# Patient Record
Sex: FEMALE | Race: WHITE | NOT HISPANIC OR LATINO | URBAN - NONMETROPOLITAN AREA
[De-identification: names, ages, dates, MRNs, and addresses within clinical notes are randomized per-mention and may not be internally consistent; named-entity substitution may affect disease eponyms.]

---

## 2023-05-26 ENCOUNTER — OFFICE VISIT (OUTPATIENT)
Dept: OBGYN CLINIC | Facility: CLINIC | Age: 73
End: 2023-05-26

## 2023-05-26 VITALS
TEMPERATURE: 98.2 F | HEIGHT: 67 IN | DIASTOLIC BLOOD PRESSURE: 80 MMHG | WEIGHT: 175 LBS | SYSTOLIC BLOOD PRESSURE: 142 MMHG | HEART RATE: 81 BPM | BODY MASS INDEX: 27.47 KG/M2
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
## 2023-05-26 DIAGNOSIS — S52.532A CLOSED COLLES' FRACTURE OF LEFT RADIUS, INITIAL ENCOUNTER: Primary | ICD-10-CM

## 2023-05-26 RX ORDER — HYDROCODONE BITARTRATE AND ACETAMINOPHEN 5; 325 MG/1; MG/1
2 TABLET ORAL
COMMUNITY
Start: 2023-05-07

## 2023-05-26 NOTE — PATIENT INSTRUCTIONS
Cast Care   WHAT YOU NEED TO KNOW:   Cast care will help the cast dry and harden correctly, and then protect it until it comes off  Your cast may need up to 48 hours to dry and harden completely  Even after your cast hardens, it can be damaged  DISCHARGE INSTRUCTIONS:   Return to the emergency department if:   Your cast breaks or gets damaged  You see drainage, or your cast is stained or smells bad  Your skin turns blue or pale  Your skin tingles, burns, or is cold or numb  You have severe pain that is getting worse and does not go away after you take pain medicine  Your limb swells, or your cast looks or feels tighter than it was before  Contact your healthcare provider if:   Something falls into your cast and gets stuck  You have itching, pain, burning, or weakness where you have the cast      You have a fever  You have sores, blisters, or breaks on the skin around the edges of the cast     You have questions or concerns about your condition or care  Follow up with your healthcare provider as directed: You will need to return to have your cast removed and your bones checked  Write down your questions so you remember to ask them during your visits  Care for your cast while it hardens:   Protect the cast   Do not put weight on the cast  Do not bend, lean on, or hit the cast with anything  Use the palms of your hands when you move the cast  Do not use your fingers  Your fingers may leave marks on the cast as it dries  Change positions often  Change your position every 2 hours to help the cast dry faster  Prop your cast on something soft, such as a pillow, to prevent a flat area on your cast      Keep the cast dry  Tie plastic trash bags around your cast to keep it dry while you bathe  You may use a blow dryer on cool or the lowest heat setting to dry your cast if it gets wet  Do not use a high heat setting, because you may burn your skin  Certain casts can get wet   Ask if you have a waterproof cast     Care for your cast after it hardens:   Check your cast every day  Contact your healthcare provider if you notice any cracks, dents, holes, or flaking on your cast      Keep your cast clean and dry  Cover your cast with a towel when you eat  You may have a small piece of cast that can be removed to check on incisions under your cast  Make sure the small piece of cast is kept tightly closed  If your cast gets dirty, use a mild detergent and a damp washcloth to wipe off the outside of your cast  Continue to cover your cast with trash bags to keep it dry while you bathe  Care for the edges of your cast   Cover the cast edges to keep them smooth  Use 4 inch pieces of waterproof tape  Place one end of the tape under the inside edge of your cast and fold it over to the outside surface  Overlap tape strips until the edges are completely covered  Change the tape as directed  Do not pull or repair any of the padding from inside the cast  This could cause blisters and sores on the skin under your cast      Keep weight off your cast   Do not let anyone push down or lean on your cast  This may cause it to break  Do not use sharp objects  Do not use a sharp or pointed object to scratch under your cast  This may cause wounds that can get infected, or you may lose the item inside the cast  If your skin itches, blow cool air under the cast  You may also gently scratch your skin outside the cast with a cloth  © Copyright Latosha Connelly 2022 Information is for End User's use only and may not be sold, redistributed or otherwise used for commercial purposes  The above information is an  only  It is not intended as medical advice for individual conditions or treatments  Talk to your doctor, nurse or pharmacist before following any medical regimen to see if it is safe and effective for you
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
## 2023-06-23 ENCOUNTER — OFFICE VISIT (OUTPATIENT)
Dept: OBGYN CLINIC | Facility: CLINIC | Age: 73
End: 2023-06-23

## 2023-06-23 ENCOUNTER — HOSPITAL ENCOUNTER (OUTPATIENT)
Dept: RADIOLOGY | Facility: CLINIC | Age: 73
End: 2023-06-23
Payer: MEDICARE

## 2023-06-23 VITALS
SYSTOLIC BLOOD PRESSURE: 138 MMHG | BODY MASS INDEX: 27.65 KG/M2 | WEIGHT: 176.2 LBS | HEIGHT: 67 IN | OXYGEN SATURATION: 99 % | TEMPERATURE: 95.8 F | HEART RATE: 69 BPM | DIASTOLIC BLOOD PRESSURE: 60 MMHG

## 2023-06-23 DIAGNOSIS — S52.532D CLOSED COLLES' FRACTURE OF LEFT RADIUS WITH ROUTINE HEALING, SUBSEQUENT ENCOUNTER: ICD-10-CM

## 2023-06-23 DIAGNOSIS — S52.532D CLOSED COLLES' FRACTURE OF LEFT RADIUS WITH ROUTINE HEALING, SUBSEQUENT ENCOUNTER: Primary | ICD-10-CM

## 2023-06-23 PROCEDURE — 73100 X-RAY EXAM OF WRIST: CPT

## 2023-06-23 PROCEDURE — 99024 POSTOP FOLLOW-UP VISIT: CPT | Performed by: ORTHOPAEDIC SURGERY

## 2023-06-23 RX ORDER — DOCUSATE SODIUM 100 MG/1
CAPSULE, LIQUID FILLED ORAL
COMMUNITY

## 2023-06-23 NOTE — PROGRESS NOTES
Assessment:   Diagnosis ICD-10-CM Associated Orders   1  Closed Colles' fracture of left radius with routine healing, subsequent encounter  S52 532D XR wrist 2 vw left     Ambulatory Referral to Physical Therapy        Plan:  · Reviewed today's physical exam findings and x-ray findings with patient at time of visit  · X-ray of left wrist OUT OF CAST taken on 06/23/2023 were reviewed and showed healing nondisplaced distal radius fracture with maintained anatomical alignment  · Risks and benefits of conservative and operative treatments were discussed in detail with the patient  Continue to proceed with conservative management  · Amb referral to physical therapy to improve ROM and strengthening of L wrist  · Instructed the patient to begin home exercise program; gentle ROM exercises  · Weight-bearing activities as tolerated  · Instructed discontinued use of wrist brace due to increased stiffness  · She can continue NSAIDs/Tylenol as needed for pain and soreness  · Follow-up: 3 weeks w/ Dr Shiv Martinez; no x-ray needed unless clinical indicated    Patient expresses understanding and is in agreement with this treatment plan  The patient was given the opportunity to ask questions or present concerns  To do next visit:  Return in about 3 weeks (around 7/14/2023) with Dr Shiv Martinez  The above stated was discussed in layman's terms and the patient expressed understanding  All questions were answered to the patient's satisfaction  Scribe Attestation    I,:  Remedios Yeboah am acting as a scribe while in the presence of the attending physician :       I,:  Harleen Romero personally performed the services described in this documentation    as scribed in my presence :         Subjective:   Nelson Alvarado is a 75 y o  female who presents today for an Initial evaluation of her left wrist due to acute pain    The patient was last seen on 05/26/2023 by Dr Shiv Martinez in which she was placed in a short arm cast and instructed to follow-up in 2 weeks for repeat x-rays to assess for interval healing  Date of injury: 05/12/2023  Dr Yennifer Kellogg is currently out of the office on vacation  On today's presentation, the patient presents today for short-arm cast removal and strength/motion check  She states that her symptoms are improving but notes tenderness about the distal radius with direct pressure or movement  She notes increased stiffness  Denies any numbness or tingling in left upper extremity  Review of systems negative unless otherwise specified in HPI  Review of Systems   Constitutional: Negative for chills and fever  HENT: Negative for ear pain and sore throat  Eyes: Negative for pain and visual disturbance  Respiratory: Negative for cough and shortness of breath  Cardiovascular: Negative for chest pain and palpitations  Gastrointestinal: Negative for abdominal pain and vomiting  Genitourinary: Negative for dysuria and hematuria  Musculoskeletal: Negative for arthralgias and back pain  Skin: Negative for color change and rash  Neurological: Negative for seizures and syncope  All other systems reviewed and are negative  History reviewed  No pertinent past medical history      Past Surgical History:   Procedure Laterality Date   • APPENDECTOMY     • TUBAL LIGATION         Family History   Problem Relation Age of Onset   • Diabetes Mother    • Diabetes Sister    • Diabetes Brother        Social History     Occupational History   • Not on file   Tobacco Use   • Smoking status: Never   • Smokeless tobacco: Never   Vaping Use   • Vaping Use: Never used   Substance and Sexual Activity   • Alcohol use: Never   • Drug use: Never   • Sexual activity: Not on file         Current Outpatient Medications:   •  docusate sodium ([employer] Stool Softener) 100 mg capsule, Take by mouth, Disp: , Rfl:   •  HYDROcodone-acetaminophen (NORCO) 5-325 mg per tablet, Take 2 tablets by mouth daily at bedtime, Disp: , Rfl:     Allergies   Allergen "Reactions   • [place] (Diagnostic) - Food Allergy Rash   • Ibuprofen Rash   • Penicillins Rash          Vitals:    06/23/23 1031   BP: 138/60   Pulse: 69   Temp: (!) 95 8 °F (35 4 °C)   SpO2: 99%       Objective:                    Ortho Exam  left wrist -  Patient presents with no obvious anatomical deformity  Skin is warm and dry to touch with no signs of erythema, ecchymosis, infection  TTP over distal radius  MMT: deferred  Mild generalized soft tissue swelling or effusion noted  Full FDS, FDP, extensor mechanisms are intact  Demonstrates normal elbow and shoulder motion  Forearm compartments are soft and supple  2+ Distal radial pulse with brisk capillary refill to the fingers  Radial, median, ulnar motor and sensory distribution intact  Sensation to light touch intact distally    Diagnostics, reviewed and taken today if performed as documented: The attending physician has personally reviewed the pertinent films in PACS and interpretation is as follows:  X-ray of left wrist OUT OF CAST taken on 06/23/2023 were reviewed and showed healing nondisplaced distal radius fracture with maintained anatomical alignment  X-Ray of left wrist taken on 05/23/2023 were reviewed and showed nondisplaced distal radius fracture without interval displacement when comparing to prior forearm x-rays at 5/12/2023  Portions of the record may have been created with voice recognition software  Occasional wrong word or \"sound a like\" substitutions may have occurred due to the inherent limitations of voice recognition software  Read the chart carefully and recognize, using context, where substitutions have occurred    "

## 2023-06-27 ENCOUNTER — EVALUATION (OUTPATIENT)
Dept: PHYSICAL THERAPY | Facility: CLINIC | Age: 73
End: 2023-06-27
Payer: MEDICARE

## 2023-06-27 DIAGNOSIS — M25.532 LEFT WRIST PAIN: Primary | ICD-10-CM

## 2023-06-27 PROCEDURE — 97161 PT EVAL LOW COMPLEX 20 MIN: CPT | Performed by: PHYSICAL THERAPIST

## 2023-06-27 PROCEDURE — 97110 THERAPEUTIC EXERCISES: CPT | Performed by: PHYSICAL THERAPIST
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
## 2023-06-29 ENCOUNTER — OFFICE VISIT (OUTPATIENT)
Dept: PHYSICAL THERAPY | Facility: CLINIC | Age: 73
End: 2023-06-29
Payer: MEDICARE

## 2023-06-29 DIAGNOSIS — M25.532 LEFT WRIST PAIN: Primary | ICD-10-CM

## 2023-06-29 PROCEDURE — 97140 MANUAL THERAPY 1/> REGIONS: CPT

## 2023-06-29 PROCEDURE — 97110 THERAPEUTIC EXERCISES: CPT

## 2023-06-29 NOTE — PROGRESS NOTES
Daily Note     Today's date: 2023  Patient name: Tomasz Mercado  : 1950  MRN: [id number]  Referring provider: Nichole Capellan DO  Dx:   Encounter Diagnosis     ICD-10-CM    1  Left wrist pain  M25 532           Start Time:   Stop Time: 1700  Total time in clinic (min): 45 minutes    Subjective: Was sore following IE  Continues to report difficulty closing fist secondary to nodule on ventral side of hand  Objective: See treatment diary below      Assessment: Tolerated treatment well  Patient exhibited good technique with therapeutic exercises and would benefit from continued PT  Unable to close fist during tendon gliding  Just able to contact digit 5 and 1 in opposition exercise  Responded well to gentle retrograde STM c/ mild improvement in swelling along dorsal surface of the hand  Performed gentle PROM to tolerance  Patient denies pain t/o treatment session  Plan: Continue per plan of care  Precautions: Colles' fracture 23    Daily Treatment Diary:      Initial Evaluation Date: 23  POC Expiration: 23  Compliance 23                   Visit Number 1 2                   Re-Eval  IE                 [facility]   Foto Captured Y                          Manuals                         Joint work             Retrograde massage nv [place]           Passive mobility Gentle nv [place] Gentle           Neuro Re-Ed                                                                                                        Ther Ex                          Wrist arom flex/ext 20x 15x           Supination/pronation x20 15x           Tendon glides x15 10x           Thumb opposition  10x                                                  Ther Activity                                       Gait Training                                       Modalities                                           Access Code: [email or web address]  URL: [email or web address]  Date: 2023  Prepared by: Ronnie Perdomo    Exercises  - Seated Digit Tendon Gliding  - 1 x daily - 7 x weekly - 3 sets - 10 reps  - Thumb Opposition  - 1 x daily - 7 x weekly - 3 sets - 10 reps  - Wrist Extension AROM  - 1 x daily - 7 x weekly - 3 sets - 10 reps  - Seated Forearm Pronation and Supination AROM  - 1 x daily - 7 x weekly - 3 sets - 10 reps

## 2023-07-03 ENCOUNTER — OFFICE VISIT (OUTPATIENT)
Dept: PHYSICAL THERAPY | Facility: CLINIC | Age: 73
End: 2023-07-03
Payer: MEDICARE

## 2023-07-03 DIAGNOSIS — M25.532 LEFT WRIST PAIN: Primary | ICD-10-CM

## 2023-07-03 PROCEDURE — 97110 THERAPEUTIC EXERCISES: CPT

## 2023-07-03 PROCEDURE — 97140 MANUAL THERAPY 1/> REGIONS: CPT

## 2023-07-03 NOTE — PROGRESS NOTES
Daily Note     Today's date: 7/3/2023  Patient name: Stacie Torres  : 1950  MRN: [id number]  Referring provider: Clau Chen DO  Dx:   Encounter Diagnosis     ICD-10-CM    1. Left wrist pain  M25.532                      Subjective: Patient reports hand feels sore but better. Reports she feels it is moving a little better. Objective: See treatment diary below      Assessment: Stacie Torres appeared to tolerate treatment well. Swelling has decreased. Improved movement and finger tip grasping improved. Continued treatment should benefit. Plan: Continue per plan of care. Precautions: Colles' fracture 23    Daily Treatment Diary:      Initial Evaluation Date: 23  POC Expiration: 23  Compliance 06/27/23  6/29  7/3                 Visit Number 1 2  3                 Re-Eval  IE                 MC   Foto Captured Y                          Manuals 6/27 6/29 7/3                       Joint work             Retrograde massage nv KS sj          Passive mobility Gentle nv KS Gentle sj Gentle          Neuro Re-Ed                                                                                                        Ther Ex                          Wrist arom flex/ext 20x 15x 20x          Supination/pronation x20 15x 20x          Tendon glides x15 10x 15x          Thumb opposition  10x 15x          marbles   2x                                    Ther Activity                                       Gait Training                                       Modalities                                           Access Code: [email or web address]  URL: [email or web address]  Date: 2023  Prepared by: Yoni Laura    Exercises  - Seated Digit Tendon Gliding  - 1 x daily - 7 x weekly - 3 sets - 10 reps  - Thumb Opposition  - 1 x daily - 7 x weekly - 3 sets - 10 reps  - Wrist Extension AROM  - 1 x daily - 7 x weekly - 3 sets - 10 reps  - Seated Forearm Pronation and Supination AROM  - 1 x daily - 7 x weekly - 3 sets - 10 reps

## 2023-07-05 ENCOUNTER — OFFICE VISIT (OUTPATIENT)
Dept: PHYSICAL THERAPY | Facility: CLINIC | Age: 73
End: 2023-07-05
Payer: MEDICARE

## 2023-07-05 DIAGNOSIS — M25.532 LEFT WRIST PAIN: Primary | ICD-10-CM

## 2023-07-05 PROCEDURE — 97140 MANUAL THERAPY 1/> REGIONS: CPT

## 2023-07-05 PROCEDURE — 97110 THERAPEUTIC EXERCISES: CPT

## 2023-07-05 NOTE — PROGRESS NOTES
Daily Note     Today's date: 2023  Patient name: Sereg Britt  : 1950  MRN: [id number]  Referring provider: Trevor Sanford DO  Dx:   Encounter Diagnosis     ICD-10-CM    1. Left wrist pain  M25.532                      Subjective: Patient reports wrist swelling continues to resolve. Still stiff though. Objective: See treatment diary below      Assessment: Serge Britt tolerated treatment well. Edema management appears to be improved. Continued treatment should improve ROM. Plan: Continue per plan of care. Precautions: Colles' fracture 23    Daily Treatment Diary:      Initial Evaluation Date: 23  POC Expiration: 23  Compliance 06/27/23  6/29  7/3  7/5               Visit Number 1 2  3  4               Re-Eval  IE                 [facility]   Foto Captured Y                          Manuals 6/27 6/29 7/3 7/5                      Joint work             Retrograde massage nv KS sj sj         Passive mobility Gentle nv KS Gentle sj Gentle sj Gentle         Neuro Re-Ed                                                                                                        Ther Ex                          Wrist arom flex/ext 20x 15x 20x 20x ea         Supination/pronation x20 15x 20x 20x         Tendon glides x15 10x 15x 15x ea         Thumb opposition  10x 15x 2x15         marbles   2x 2x                                   Ther Activity                                       Gait Training                                       Modalities                                           Access Code: [email or web address]  URL: [email or web address]  Date: 2023  Prepared by: Rafael Morales    Exercises  - Seated Digit Tendon Gliding  - 1 x daily - 7 x weekly - 3 sets - 10 reps  - Thumb Opposition  - 1 x daily - 7 x weekly - 3 sets - 10 reps  - Wrist Extension AROM  - 1 x daily - 7 x weekly - 3 sets - 10 reps  - Seated Forearm Pronation and Supination AROM  - 1 x daily - 7 x weekly - 3 sets - 10 reps

## 2023-07-12 ENCOUNTER — OFFICE VISIT (OUTPATIENT)
Dept: PHYSICAL THERAPY | Facility: CLINIC | Age: 73
End: 2023-07-12
Payer: MEDICARE

## 2023-07-12 DIAGNOSIS — M25.532 LEFT WRIST PAIN: Primary | ICD-10-CM

## 2023-07-12 PROCEDURE — 97140 MANUAL THERAPY 1/> REGIONS: CPT

## 2023-07-12 PROCEDURE — 97110 THERAPEUTIC EXERCISES: CPT

## 2023-07-12 NOTE — PROGRESS NOTES
Daily Note     Today's date: 2023  Patient name: Stephanie Grullon  : 1950  MRN: [id number]  Referring provider: Kori Delgado DO  Dx:   Encounter Diagnosis     ICD-10-CM    1. Left wrist pain  M25.532                      Subjective: Patient reports she feels swelling is decreasing and is limited to a small area. Objective: See treatment diary below      Assessment: Stephanie Grullon tolerated treatment well. She has improving ROM. She tolerated new exercises well. Continued treatment indicated. Plan: Continue per plan of care. Precautions: Colles' fracture 23    Daily Treatment Diary:      Initial Evaluation Date: 23  POC Expiration: 23  Compliance 06/27/23  6/29  7/3  7/5  7/12             Visit Number 1 2  3  4  5             Re-Eval  IE                 MC   Foto Captured Y                          Manuals 6/27 6/29 7/3 7/5 7/12                     Joint work             Retrograde massage nv KS sj sj sj        Passive mobility Gentle nv KS Gentle sj Gentle sj Gentle sj Gentle        Neuro Re-Ed                                                                                                        Ther Ex                          Wrist arom flex/ext 20x 15x 20x 20x ea 20x ea        Supination/pronation x20 15x 20x 20x 20x        Tendon glides x15 10x 15x 15x ea 15x ea        Thumb opposition  10x 15x 2x15 2x15        marbles   2x 2x -        Power web grasp     Red x30        digigrip finger press     x10 ea        maze     x3                       putty     yellow 5'        Ther Activity                                       Gait Training                                       Modalities                                           Access Code: [email or web address]  URL: [email or web address]  Date: 2023  Prepared by: Olu White    Exercises  - Seated Digit Tendon Gliding  - 1 x daily - 7 x weekly - 3 sets - 10 reps  - Thumb Opposition  - 1 x daily - 7 x weekly - 3 sets - 10 reps  - Wrist Extension AROM  - 1 x daily - 7 x weekly - 3 sets - 10 reps  - Seated Forearm Pronation and Supination AROM  - 1 x daily - 7 x weekly - 3 sets - 10 reps

## 2023-07-13 ENCOUNTER — OFFICE VISIT (OUTPATIENT)
Dept: PHYSICAL THERAPY | Facility: CLINIC | Age: 73
End: 2023-07-13
Payer: MEDICARE

## 2023-07-13 DIAGNOSIS — M25.532 LEFT WRIST PAIN: Primary | ICD-10-CM

## 2023-07-13 PROCEDURE — 97140 MANUAL THERAPY 1/> REGIONS: CPT | Performed by: PHYSICAL THERAPIST

## 2023-07-13 PROCEDURE — 97110 THERAPEUTIC EXERCISES: CPT | Performed by: PHYSICAL THERAPIST

## 2023-07-13 NOTE — PROGRESS NOTES
Daily Note     Today's date: 2023  Patient name: Stacie Torres  : 1950  MRN: [id number]  Referring provider: Clau Chen DO  Dx:   Encounter Diagnosis     ICD-10-CM    1. Left wrist pain  M25.532                      Subjective: getting better. Using it for more. Cc is thickness along palmar aspect and tightness with 3rd digit      Objective: See treatment diary below      Assessment: ROM improving consistently at wrist most notably with extension. Minimal swelling. Stiffness 3rd mcp. Able to achieve near full composite fist after manual work. Plan: wrist mobility, light strength     Precautions: Colles' fracture 23    Daily Treatment Diary:      Initial Evaluation Date: 23  POC Expiration: 23  Compliance 06/27/23  6/29  7/3  7/5  7/12  7/13           Visit Number 1 2  3  4  5  6           Re-Eval  IE                 MC   Foto Captured Y                          Manuals 6/27 6/29 7/3 7/5 7/12 7/13                    Joint work             Retrograde massage nv KS sj sj sj        Passive mobility Gentle nv KS Gentle sj Gentle sj Gentle sj Gentle Wrist ext and 3rd mcp flex - MM       Neuro Re-Ed                                                                                                        Ther Ex                          Wrist arom flex/ext 20x 15x 20x 20x ea 20x ea x20       Supination/pronation x20 15x 20x 20x 20x        Tendon glides x15 10x 15x 15x ea 15x ea x20       Thumb opposition  10x 15x 2x15 2x15        marbles   2x 2x -        Power web grasp     Red x30 Red x30       digigrip finger press     x10 ea x20 red       maze     x3  x5                     putty     yellow 5'        Ther Activity                                       Gait Training                                       Modalities                                           Access Code: [email or web address]  URL: [email or web address]  Date: 2023  Prepared by: Yoni Laura    Exercises  - Seated Digit Tendon Gliding  - 1 x daily - 7 x weekly - 3 sets - 10 reps  - Thumb Opposition  - 1 x daily - 7 x weekly - 3 sets - 10 reps  - Wrist Extension AROM  - 1 x daily - 7 x weekly - 3 sets - 10 reps  - Seated Forearm Pronation and Supination AROM  - 1 x daily - 7 x weekly - 3 sets - 10 reps

## 2023-07-19 ENCOUNTER — OFFICE VISIT (OUTPATIENT)
Dept: OBGYN CLINIC | Facility: CLINIC | Age: 73
End: 2023-07-19
Payer: MEDICARE

## 2023-07-19 VITALS
DIASTOLIC BLOOD PRESSURE: 80 MMHG | HEIGHT: 67 IN | WEIGHT: 176 LBS | SYSTOLIC BLOOD PRESSURE: 122 MMHG | HEART RATE: 70 BPM | TEMPERATURE: 97.6 F | BODY MASS INDEX: 27.62 KG/M2

## 2023-07-19 DIAGNOSIS — S52.532D CLOSED COLLES' FRACTURE OF LEFT RADIUS WITH ROUTINE HEALING, SUBSEQUENT ENCOUNTER: Primary | ICD-10-CM

## 2023-07-19 DIAGNOSIS — M79.645 PAIN OF LEFT MIDDLE FINGER: ICD-10-CM

## 2023-07-19 PROCEDURE — 99213 OFFICE O/P EST LOW 20 MIN: CPT | Performed by: STUDENT IN AN ORGANIZED HEALTH CARE EDUCATION/TRAINING PROGRAM

## 2023-07-19 NOTE — PROGRESS NOTES
1. Closed Colles' fracture of left radius with routine healing, subsequent encounter        2. Pain of left middle finger          No orders of the defined types were placed in this encounter. Imaging Studies (I personally reviewed images in PACS and report):    • X-ray left wrist 6/23/2023: Stable alignment of a healing distal left radius fracture with intra-articular extension. • X-ray left wrist 5/23/2023: Demonstrates nondisplaced distal radius fracture without interval displacement when comparing to prior forearm x-rays at 5/12/2023. IMPRESSION:  • Acute left wrist pain/injury secondary to fall on outstretched left hand  • Nondisplaced distal radius fracture/Colles' fracture -radiographic healing  • Completed casting and was transitioned out of cast on 6/23/2023 that she was subsequently referred to physical therapy  • Some improvement since starting formal physical therapy in regards to decrease swelling and improve wrist range of motion. She does complain of left middle finger/palmar pain with a noted contracture of the middle digits (differential includes developing Dupuytren's contracture versus flexor tendon stiffness from immobilization)    Date of Injury: 5/12/2023      PLAN:    • Clinical exam and radiographic imaging reviewed with patient today, with impression as per above. I have discussed with the patient the pathophysiology of this diagnosis and reviewed how the examination correlates with this diagnosis.     • Reassured patient of her progressive improvement since last visit with formal physical therapy and I counseled her to discuss with PT when to transition to a home exercise program.  I counseled importance of maintaining home exercise program as she still has somewhat limited range of motion and function of her wrist.  • In regards to her palmar middle finger pain/contracture of her hand, I counseled continue manual therapy and working with physical therapy to help loosen the contracture. I discussed if it does not progressively improve over time, he could be considered for surgical intervention as well as this may be a developing Dupuytren's. • Counseled use of her left hand as tolerated going forward. • Regards to follow-up, I counseled that we could make appointment in about 4 to 6 weeks to reevaluate her progress but patient prefers to call back if needed if it does not progressively improve which is reasonable. Return if symptoms worsen or fail to improve. Portions of the record may have been created with voice recognition software. Occasional wrong word or "sound a like" substitutions may have occurred due to the inherent limitations of voice recognition software. Read the chart carefully and recognize, using context, where substitutions have occurred. CHIEF COMPLAINT:  Chief Complaint   Patient presents with   • Left Wrist - Follow-up   • Follow-up         HPI:  Jong Moreno is a 75 y.o. female  who presents with her significant other for     Visit 7/19/2023: Follow-up evaluation left distal radius fracture/formal physical therapy:  Patient reports some improvement since last visit. She feels with physical therapy she has had decreased swelling of her wrist but not complete resolution. She reports improving range of motion but still has difficulty with fully extending and flexing her wrist.  She also reports pain over the palmar aspect of her hand and specifically her middle finger and notices limited ability to completely contract and extend her middle digit without aggravating or palmar pain. She states there is a tightness over the palmar aspect of right hand but no swelling. She states she is never had pain like this previously. She reports physical therapy has been working on trying to loosen up this contracture with massage therapy. Denies any N/T of her left upper extremity. Denies regular use of pain medication.       Medical, Surgical, Family, and Social History    History reviewed. No pertinent past medical history. Past Surgical History:   Procedure Laterality Date   • APPENDECTOMY     • TUBAL LIGATION       Social History   Social History     Substance and Sexual Activity   Alcohol Use Never     Social History     Substance and Sexual Activity   Drug Use Never     Social History     Tobacco Use   Smoking Status Never   Smokeless Tobacco Never     Family History   Problem Relation Age of Onset   • Diabetes Mother    • Diabetes Sister    • Diabetes Brother      Allergies   Allergen Reactions   • [place] (Diagnostic) - Food Allergy Rash   • Ibuprofen Rash   • Penicillins Rash          Physical Exam  /80 (BP Location: Left arm)   Pulse 70   Temp 97.6 °F (36.4 °C) (Temporal)   Ht 5' 7" (1.702 m)   Wt 79.8 kg (176 lb)   BMI 27.57 kg/m²     Constitutional:  see vital signs  Gen: well-developed, normocephalic/atraumatic, well-groomed  Pulmonary/Chest: Effort normal. No respiratory distress. Right Hand Exam     Other   Right wrist pulse absent: 2+ radial.      Left Hand Exam     Tenderness   Left hand tenderness location: +palmar aspect of hand along flexor tendon of middle digit. Otherwise, non-tender throughout hand/wrist.     Range of Motion   Wrist   Extension: 20   Flexion: 30   Pronation: 90   Supination: 90     Muscle Strength   Left wrist normal muscle strength: Gripping aggravates palmar hand pain.   Wrist extension: 5/5   Wrist flexion: 5/5   :  4/5     Other   Erythema: absent    Comments:  Left hand  Left middle digit has almost complete range of motion at the MCP/PIP/DIP but is slightly less than other digits due to a sense of tightness and pain over the palm of her hand at the site of the flexor thickening/nodule  Full digit MCP/PIP/DIP motion without malrotation or digital scissoring  Thumb MCP/DIP intact with opposition  Sensation intact in radial/median/ulnar distribution                  Procedures

## 2023-07-20 ENCOUNTER — OFFICE VISIT (OUTPATIENT)
Dept: PHYSICAL THERAPY | Facility: CLINIC | Age: 73
End: 2023-07-20
Payer: MEDICARE

## 2023-07-20 DIAGNOSIS — M25.532 LEFT WRIST PAIN: Primary | ICD-10-CM

## 2023-07-20 PROCEDURE — 97110 THERAPEUTIC EXERCISES: CPT

## 2023-07-20 PROCEDURE — 97140 MANUAL THERAPY 1/> REGIONS: CPT

## 2023-07-20 NOTE — PROGRESS NOTES
Daily Note     Today's date: 2023  Patient name: Geoffrey Rangel  : 1950  MRN: [id number]  Referring provider: Reba Mortimer, DO  Dx:   Encounter Diagnosis     ICD-10-CM    1. Left wrist pain  M25.532                      Subjective: Patient reports she is working diligently on HEP. Feels movement is improving. Saw doctor and he is happy with where she is. Objective: See treatment diary below      Assessment: Geoffrey Rangel continues with steady improvement to ROM goals. She has increased movement with exercises. Wrist flexion and extension most notable movement increase. Continue. Plan: Continue per plan of care.       Precautions: Colles' fracture 23    Daily Treatment Diary:      Initial Evaluation Date: 23  POC Expiration: 23  Compliance 06/27/23  6/29  7/3  7         Visit Number 1 2  3  4  5  6  7         Re-Eval  IE                 MC   Foto Captured Y                          Manuals 6/27 6/29 7/3 7/5 7/12 7/13 7/20                   Joint work             Retrograde massage nv KS sj sj sj        Passive mobility Gentle nv KS Gentle sj Gentle sj Gentle sj Gentle Wrist ext and 3rd mcp flex - MM Wrist ext and 3rd mcp flex - MM      Neuro Re-Ed                                                                                                        Ther Ex                          Wrist arom flex/ext 20x 15x 20x 20x ea 20x ea x20 1# x20      Supination/pronation x20 15x 20x 20x 20x  1# 5"x20      Tendon glides x15 10x 15x 15x ea 15x ea x20 x20      Thumb opposition  10x 15x 2x15 2x15        marbles   2x 2x -        Power web grasp     Red x30 Red x30 Red x30      digigrip finger press     x10 ea x20 red x20 red      maze     x3  x5 x5                    putty     yellow 5'        Ther Activity                                       Gait Training                                       Modalities                                           Access Code: [email or web address]  URL: [email or web address]  Date: 06/27/2023  Prepared by: Sarah Caballero  - Seated Digit Tendon Gliding  - 1 x daily - 7 x weekly - 3 sets - 10 reps  - Thumb Opposition  - 1 x daily - 7 x weekly - 3 sets - 10 reps  - Wrist Extension AROM  - 1 x daily - 7 x weekly - 3 sets - 10 reps  - Seated Forearm Pronation and Supination AROM  - 1 x daily - 7 x weekly - 3 sets - 10 reps

## 2023-07-25 ENCOUNTER — OFFICE VISIT (OUTPATIENT)
Dept: PHYSICAL THERAPY | Facility: CLINIC | Age: 73
End: 2023-07-25
Payer: MEDICARE

## 2023-07-25 DIAGNOSIS — M25.532 LEFT WRIST PAIN: Primary | ICD-10-CM

## 2023-07-25 PROCEDURE — 97140 MANUAL THERAPY 1/> REGIONS: CPT | Performed by: PHYSICAL THERAPIST

## 2023-07-25 PROCEDURE — 97110 THERAPEUTIC EXERCISES: CPT | Performed by: PHYSICAL THERAPIST

## 2023-07-25 NOTE — PROGRESS NOTES
Daily Note     Today's date: 2023  Patient name: Cooper Mac  : 1950  MRN: [id number]  Referring provider: Aris Ojeda DO  Dx:   Encounter Diagnosis     ICD-10-CM    1. Left wrist pain  M25.532                       Subjective: been doing more baking. Gets some discomfort with end-range supination at times      Objective: See treatment diary below      Assessment: 3rd digit approx 3 mm from full composite fist. Missing terminal wrist ext and supination. Overall improving nicely. Added more functional strength with tband rows and bicep curls. ocmpleted curls in neutral as supination was sore.        Plan: continue with strength towards full mobility and progress strength     Precautions: Colles' fracture 23    Daily Treatment Diary:      Initial Evaluation Date: 23  POC Expiration: 23  Compliance 06/27/23  6/29  7/3  7/5  7/12  7/13  7/20  7/24       Visit Number 1 2  3  4  5  6  7 8       Re-Eval  IE                 MC   Foto Captured Y                          Manuals 6/27 6/29 7/3 7/                  Joint work             Retrograde massage nv KS sj sj sj        Passive mobility Gentle nv KS Gentle sj Gentle sj Gentle sj Gentle Wrist ext and 3rd mcp flex - MM Wrist ext and 3rd mcp flex - MM Wrist ext and 3rd mcp flex - MM     Neuro Re-Ed                                                                                                        Ther Ex             ube        2'/2' for gripping and endurance     Wrist arom flex/ext 20x 15x 20x 20x ea 20x ea x20 1# x20 1# 3x10 ea     Supination/pronation x20 15x 20x 20x 20x  1# 5"x20 1# x20     Tendon glides x15 10x 15x 15x ea 15x ea x20 x20      Thumb opposition  10x 15x 2x15 2x15        marbles   2x 2x -        Power web grasp     Red x30 Red x30 Red x30 Red x30     digigrip finger press     x10 ea x20 red x20 red Red x20     maze     x3  x5 x5 x5                  tband row        x25 gtb     tband bicep curls gtb x25      putty     yellow 5'        Ther Activity                                       Gait Training                                       Modalities                                           Access Code: [email or web address]  URL: [email or web address]  Date: 06/27/2023  Prepared by: Heather Bruce    Exercises  - Seated Digit Tendon Gliding  - 1 x daily - 7 x weekly - 3 sets - 10 reps  - Thumb Opposition  - 1 x daily - 7 x weekly - 3 sets - 10 reps  - Wrist Extension AROM  - 1 x daily - 7 x weekly - 3 sets - 10 reps  - Seated Forearm Pronation and Supination AROM  - 1 x daily - 7 x weekly - 3 sets - 10 reps

## 2023-07-27 ENCOUNTER — APPOINTMENT (OUTPATIENT)
Dept: PHYSICAL THERAPY | Facility: CLINIC | Age: 73
End: 2023-07-27
Payer: MEDICARE

## 2023-08-02 ENCOUNTER — EVALUATION (OUTPATIENT)
Dept: PHYSICAL THERAPY | Facility: CLINIC | Age: 73
End: 2023-08-02
Payer: MEDICARE

## 2023-08-02 DIAGNOSIS — M25.532 LEFT WRIST PAIN: Primary | ICD-10-CM

## 2023-08-02 PROCEDURE — 97110 THERAPEUTIC EXERCISES: CPT

## 2023-08-02 PROCEDURE — 97140 MANUAL THERAPY 1/> REGIONS: CPT
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
## 2023-08-09 ENCOUNTER — OFFICE VISIT (OUTPATIENT)
Dept: PHYSICAL THERAPY | Facility: CLINIC | Age: 73
End: 2023-08-09
Payer: MEDICARE

## 2023-08-09 DIAGNOSIS — M25.532 LEFT WRIST PAIN: Primary | ICD-10-CM

## 2023-08-09 PROCEDURE — 97140 MANUAL THERAPY 1/> REGIONS: CPT

## 2023-08-09 PROCEDURE — 97110 THERAPEUTIC EXERCISES: CPT

## 2023-08-09 NOTE — PROGRESS NOTES
Daily Note     Today's date: 2023  Patient name: Riki Rincon  : 1950  MRN: [id number]  Referring provider: Curt Canela DO  Dx:   Encounter Diagnosis     ICD-10-CM    1. Left wrist pain  M25.532                      Subjective: Patient reports middle finger still doesn't bend all the way. Reports continues to get some numbness. Objective: See treatment diary below      Assessment: Riki Rincon continues with steady improvement towards ROM goals. Strength has improved. Nerve glides performed today. She would benefit from additional visits to restore motion. Plan: Continue per plan of care.       Precautions: Col[place]' fracture 23    Daily Treatment Diary:      Initial Evaluation Date: 23  POC Expiration: 23  Compliance 06/27/23  6/29  7/3  7/5  7/12  7/13  7/20  7/24  8/2  8/9   Visit Number 1 2  3  4  5  6  7 8  9  10   Re-Eval  IE                 [facility]   Foto Captured Y                          Manuals 6/27 6/29 7/3 7/5 7/12 7/13 7/20 7/24 8/2 8/9                Joint work             Retrograde massage nv KS sj sj sj        Radial nerve glide          x30   Passive mobility Gentle nv KS Gentle sj Gentle sj Gentle sj Gentle Wrist ext and 3rd mcp flex - MM Wrist ext and 3rd mcp flex - MM Wrist ext and 3rd mcp flex - MM Wrist ext and 3rd mcp flex - MM Wrist ext and 3rd mcp flex   Neuro Re-Ed                                                                                                        Ther Ex             ube        2'/2' for gripping and endurance 2'/2' for gripping and endurance 3'/3'  for gripping and endurance   Wrist arom flex/ext 20x 15x 20x 20x ea 20x ea x20 1# x20 1# 3x10 ea 2# 3x10 ea 3# 3x10 ea   Supination/pronation x20 15x 20x 20x 20x  1# 5"x20 1# x20 2# x20 3#  x20   Tendon glides x15 10x 15x 15x ea 15x ea x20 x20      Thumb opposition  10x 15x 2x15 2x15        marbles   2x 2x -        Power web grasp     Red x30 Red x30 Red x30 Red x30 Green   x30 green x30 digigrip finger press     x10 ea x20 red x20 red Red x20 Red x20 green x20   maze     x3  x5 x5 x5 x10    Power bar          Red  x30 ea   tband row        x25 gtb x25 gtb btb x30   tband bicep curls        gtb x25 x25 gtb btb  x30   theraband low row          btb  x30    putty     yellow 5'        Ther Activity                                       Gait Training                                       Modalities                                           Access Code: [email or web address]  URL: [email or web address]  Date: 06/27/2023  Prepared by: Aure Purpura    Exercises  - Seated Digit Tendon Gliding  - 1 x daily - 7 x weekly - 3 sets - 10 reps  - Thumb Opposition  - 1 x daily - 7 x weekly - 3 sets - 10 reps  - Wrist Extension AROM  - 1 x daily - 7 x weekly - 3 sets - 10 reps  - Seated Forearm Pronation and Supination AROM  - 1 x daily - 7 x weekly - 3 sets - 10 reps

## 2023-08-16 ENCOUNTER — OFFICE VISIT (OUTPATIENT)
Dept: PHYSICAL THERAPY | Facility: CLINIC | Age: 73
End: 2023-08-16
Payer: MEDICARE

## 2023-08-16 DIAGNOSIS — M25.532 LEFT WRIST PAIN: Primary | ICD-10-CM

## 2023-08-16 PROCEDURE — 97140 MANUAL THERAPY 1/> REGIONS: CPT

## 2023-08-16 PROCEDURE — 97110 THERAPEUTIC EXERCISES: CPT

## 2023-08-16 NOTE — PROGRESS NOTES
Daily Note     Today's date: 2023  Patient name: Tim Coppola  : 1950  MRN: [id number]  Referring provider: Veronica Kaplan DO  Dx:   Encounter Diagnosis     ICD-10-CM    1. Left wrist pain  M25.532                      Subjective: Patient reports she is doing pretty much every thing with her hand. Reports some numbness noted after a 5 hour drive. Objective: See treatment diary below. Mikel L 30# position 2      Assessment: Tim Coppola strength appears to be improving. 3rd digit motion appears to be normalized. Anticipate D/C next 1 to 2 visits as strength continues to improve. Plan: Potential discharge next visit. Test  and 3rd digit ROM next visit.       Precautions: Colles' fracture 23    Daily Treatment Diary:      Initial Evaluation Date: 23  POC Expiration: 23  Compliance 8/16  6/29  7/3  7/5  7/12  7/13  7/20  7/24  8/2  8/9   Visit Number 11 2  3  4  5  6  7 8  9  10   Re-Eval                   [facility]   Foto Captured                           Manuals 8/16 6/29 7/3 7/5 7/12 7/13 7/20 7/24 8/2 8/9                Joint work             Retrograde massage  KS sj sj sj        Radial nerve glide          x30   Passive mobility Wrist ext and 3rd mcp flex KS Gentle sj Gentle sj Gentle sj Gentle Wrist ext and 3rd mcp flex - MM Wrist ext and 3rd mcp flex - MM Wrist ext and 3rd mcp flex - MM Wrist ext and 3rd mcp flex - MM Wrist ext and 3rd mcp flex   Neuro Re-Ed                                                                                                        Ther Ex             ube 3'/3'  for gripping and endurance       2'/2' for gripping and endurance 2'/2' for gripping and endurance 3'/3'  for gripping and endurance   Wrist arom flex/ext 3# 3x10 ea 15x 20x 20x ea 20x ea x20 1# x20 1# 3x10 ea 2# 3x10 ea 3# 3x10 ea   Supination/pronation 3#  x20 15x 20x 20x 20x  1# 5"x20 1# x20 2# x20 3#  x20   Tendon glides  10x 15x 15x ea 15x ea x20 x20      Thumb opposition  10x 15x 2x15 2x15        marbles   2x 2x -        Power web grasp Blue x30 + splaying x30    Red x30 Red x30 Red x30 Red x30 Green   x30 green x30   digigrip finger press Blue x20    x10 ea x20 red x20 red Red x20 Red x20 green x20   maze     x3  x5 x5 x5 x10    Power bar N/U, rotation Red  x20 ea         Red  x30 ea   tband row btb x30       x25 gtb x25 gtb btb x30   tband bicep curls btb x30       gtb x25 x25 gtb btb  x30   theraband low row btb x30         btb  x30    putty     yellow 5'        Ther Activity                                       Gait Training                                       Modalities                                           Access Code: [email or web address]  URL: [email or web address]  Date: 06/27/2023  Prepared by: Yoni Laura    Exercises  - Seated Digit Tendon Gliding  - 1 x daily - 7 x weekly - 3 sets - 10 reps  - Thumb Opposition  - 1 x daily - 7 x weekly - 3 sets - 10 reps  - Wrist Extension AROM  - 1 x daily - 7 x weekly - 3 sets - 10 reps  - Seated Forearm Pronation and Supination AROM  - 1 x daily - 7 x weekly - 3 sets - 10 reps

## 2023-08-23 ENCOUNTER — OFFICE VISIT (OUTPATIENT)
Dept: PHYSICAL THERAPY | Facility: CLINIC | Age: 73
End: 2023-08-23
Payer: MEDICARE

## 2023-08-23 DIAGNOSIS — M25.532 LEFT WRIST PAIN: Primary | ICD-10-CM

## 2023-08-23 PROCEDURE — 97140 MANUAL THERAPY 1/> REGIONS: CPT

## 2023-08-23 PROCEDURE — 97110 THERAPEUTIC EXERCISES: CPT

## 2023-08-23 NOTE — PROGRESS NOTES
Daily Note/Discharge Summary     Today's date: 2023  Patient name: Johana Wetzel  : 1950  MRN: [id number]  Referring provider: Lynne Young DO  Dx:   Encounter Diagnosis     ICD-10-CM    1. Left wrist pain  M25.532                      Subjective: Patient reports all is feeling good. No issues making a fist or doing any things around the house. Objective: See treatment diary below. Mikel L2 27# trial 1, 40# trial 2, 40# trial 3      Assessment: Johana Wetzel has achieved all ST and LT goals. She has a good understanding of HEP and progression. She should do well with D/C to HEP. Plan: D/C to HEP as per PT POC.       Precautions: Colles' fracture 23    Daily Treatment Diary:      Initial Evaluation Date: 23  POC Expiration: 23  Compliance 8/16 8/23  7/3  7/5  7/12  7/13  7/20  7/24  8/2  8/9   Visit Number 11 12  3  4  5  6  7 8  9  10   Re-Eval                   MC   Foto Captured                           Manuals 8/16 8/23 7/3 7/5 7/12 7/13 7/20 7/24 8/2 8/9                Joint work             Retrograde massage   sj sj sj        Radial nerve glide          x30   Passive mobility Wrist ext and 3rd mcp flex Wrist ext and 3rd mcp flex sj Gentle sj Gentle sj Gentle Wrist ext and 3rd mcp flex - MM Wrist ext and 3rd mcp flex - MM Wrist ext and 3rd mcp flex - MM Wrist ext and 3rd mcp flex - MM Wrist ext and 3rd mcp flex   Neuro Re-Ed                                                                                                        Ther Ex             ube 3'/3'  for gripping and endurance 3'/3'  for gripping and endurance      2'/2' for gripping and endurance 2'/2' for gripping and endurance 3'/3'  for gripping and endurance   Wrist arom flex/ext 3# 3x10 ea 3# 3x10 ea 20x 20x ea 20x ea x20 1# x20 1# 3x10 ea 2# 3x10 ea 3# 3x10 ea   Supination/pronation 3#  x20 3#  x30 20x 20x 20x  1# 5"x20 1# x20 2# x20 3#  x20   Tendon glides   15x 15x ea 15x ea x20 x20      Thumb opposition 15x 2x15 2x15        marbles   2x 2x -        Power web grasp Blue x30 + splaying x30 Blue x30 + splaying x30   Red x30 Red x30 Red x30 Red x30 Green   x30 green x30   digigrip finger press Blue x20 Blue x20   x10 ea x20 red x20 red Red x20 Red x20 green x20   maze     x3  x5 x5 x5 x10    Power bar N/U, rotation Red  x20 ea Red  x20 ea        Red  x30 ea   tband row btb x30 btb x30      x25 gtb x25 gtb btb x30   tband bicep curls btb x30 btb x30      gtb x25 x25 gtb btb  x30   theraband low row btb x30 btb x30        btb  x30    putty     yellow 5'        Ther Activity                                       Gait Training                                       Modalities                                           Access Code: [email or web address]  URL: [email or web address]  Date: 06/27/2023  Prepared by: Debra Orozco    Exercises  - Seated Digit Tendon Gliding  - 1 x daily - 7 x weekly - 3 sets - 10 reps  - Thumb Opposition  - 1 x daily - 7 x weekly - 3 sets - 10 reps  - Wrist Extension AROM  - 1 x daily - 7 x weekly - 3 sets - 10 reps  - Seated Forearm Pronation and Supination AROM  - 1 x daily - 7 x weekly - 3 sets - 10 reps

## 2023-10-09 ENCOUNTER — OFFICE VISIT (OUTPATIENT)
Dept: URGENT CARE | Facility: CLINIC | Age: 73
End: 2023-10-09
Payer: MEDICARE

## 2023-10-09 VITALS
RESPIRATION RATE: 18 BRPM | HEART RATE: 82 BPM | SYSTOLIC BLOOD PRESSURE: 159 MMHG | DIASTOLIC BLOOD PRESSURE: 72 MMHG | TEMPERATURE: 97.6 F | BODY MASS INDEX: 27 KG/M2 | WEIGHT: 172 LBS | OXYGEN SATURATION: 94 % | HEIGHT: 67 IN
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
## 2023-10-09 DIAGNOSIS — S30.861A TICK BITE OF ABDOMEN, INITIAL ENCOUNTER: Primary | ICD-10-CM

## 2023-10-09 PROCEDURE — 99213 OFFICE O/P EST LOW 20 MIN: CPT | Performed by: PHYSICIAN ASSISTANT

## 2023-10-09 PROCEDURE — G0463 HOSPITAL OUTPT CLINIC VISIT: HCPCS | Performed by: PHYSICIAN ASSISTANT

## 2023-10-09 RX ORDER — DOXYCYCLINE 100 MG/1
200 TABLET ORAL ONCE
Qty: 2 TABLET | Refills: 0 | Status: SHIPPED | COMMUNITY
Start: 2023-10-09 | End: 2023-10-09

## 2023-10-09 RX ORDER — DOXYCYCLINE 100 MG/1
200 TABLET ORAL ONCE
Qty: 2 TABLET | Refills: 0 | Status: SHIPPED | OUTPATIENT
Start: 2023-10-09 | End: 2023-10-09

## 2023-10-09 NOTE — PROGRESS NOTES
[facility]        NAME: Rusty Funes is a 75 y.o. female  : 1950    MRN: [id number]  DATE: 2023  TIME: 10:08 AM    Assessment and Plan   Tick bite of abdomen, initial encounter [N12.484L, W57. XXXA]  1. Tick bite of abdomen, initial encounter  doxycycline (ADOXA) 100 MG tablet    DISCONTINUED: doxycycline (ADOXA) 100 MG tablet            Patient Instructions   Monitor wound for signs of infection such as redness, swelling, discharge, pain, fever, or chills. Monitor for signs of Lymes disease such as flu-like symptoms as bulls-eye rash. Rash may appear at site of tick bite or other parts of body. Take doxycycline with food. Do not consume dairy or reflux medications 2 hours before to hours after as this inhibit the absorption of the antibiotic. Common side effects include nausea, vomiting, diarrhea, upset stomach. Take a probiotic to avoid this. Avoid sun exposure due to photophobia. Follow up with PCP in 3-5 days. Proceed to  ER if symptoms worsen. Chief Complaint     Chief Complaint   Patient presents with   • Tick Bite     On abdomen; noticed tick on Saturday night and tried to remove it not sure if head is still in there; possibly a deer tick; rash on legs         History of Present Illness       Pt is a 76 yo f with no sig PMHx presents to the office c.o tick bite to abd. She tried to remove it at home but concerned the head is still there. She had a mild red rash to b/l LE but denies target rash. Denies f/c. Review of Systems   Review of Systems   Constitutional: Negative for chills and fever. HENT: Negative for trouble swallowing. Respiratory: Negative for cough and chest tightness. Skin: Positive for rash and wound.          Current Medications       Current Outpatient Medications:   •  Cholecalciferol (VITAMIN D3 PO), Take by mouth, Disp: , Rfl:   •  docusate sodium ([employer] Stool Softener) 100 mg capsule, Take by mouth, Disp: , Rfl:   •  doxycycline (ADOXA) 100 MG tablet, Take 2 tablets (200 mg total) by mouth 1 (one) time for 1 dose, Disp: 2 tablet, Rfl: 0  •  HYDROcodone-acetaminophen (NORCO) 5-325 mg per tablet, Take 2 tablets by mouth daily at bedtime, Disp: , Rfl:   •  Multiple Vitamins-Minerals (ZINC PO), Take by mouth, Disp: , Rfl:     Current Allergies     Allergies as of 10/09/2023 - Reviewed 10/09/2023   Allergen Reaction Noted   • [place] (diagnostic) - food allergy Rash 05/26/2023   • Ibuprofen Rash 05/26/2023   • Penicillins Rash 05/26/2023            The following portions of the patient's history were reviewed and updated as appropriate: allergies, current medications, past family history, past medical history, past social history, past surgical history and problem list.     Past Medical History:   Diagnosis Date   • Back pain        Past Surgical History:   Procedure Laterality Date   • APPENDECTOMY     • TONSILLECTOMY     • TUBAL LIGATION         Family History   Problem Relation Age of Onset   • Diabetes Mother    • Diabetes Sister    • Diabetes Brother          Medications have been verified. Objective   /72   Pulse 82   Temp 97.6 °F (36.4 °C)   Resp 18   Ht 5' 7" (1.702 m)   Wt 78 kg (172 lb)   SpO2 94%   BMI 26.94 kg/m²   No LMP recorded. Patient is postmenopausal.       Physical Exam     Physical Exam  Vitals and nursing note reviewed. Constitutional:       Appearance: She is well-developed. HENT:      Head: Normocephalic and atraumatic. Right Ear: External ear normal.      Left Ear: External ear normal.      Nose: Nose normal.   Eyes:      General: Lids are normal.      Conjunctiva/sclera: Conjunctivae normal.   Abdominal:       Skin:     General: Skin is warm and dry. Capillary Refill: Capillary refill takes less than 2 seconds. Findings: No rash. Neurological:      Mental Status: She is alert.

## 2024-01-30 ENCOUNTER — APPOINTMENT (OUTPATIENT)
Dept: RADIOLOGY | Facility: CLINIC | Age: 74
End: 2024-01-30
Payer: MEDICARE

## 2024-01-30 ENCOUNTER — HOSPITAL ENCOUNTER (EMERGENCY)
Facility: HOSPITAL | Age: 74
Discharge: HOME | End: 2024-01-30
Attending: EMERGENCY MEDICINE
Payer: MEDICARE

## 2024-01-30 ENCOUNTER — OFFICE VISIT (OUTPATIENT)
Dept: URGENT CARE | Facility: CLINIC | Age: 74
End: 2024-01-30
Payer: MEDICARE

## 2024-01-30 VITALS
RESPIRATION RATE: 16 BRPM | TEMPERATURE: 98.6 F | BODY MASS INDEX: 27.97 KG/M2 | DIASTOLIC BLOOD PRESSURE: 72 MMHG | OXYGEN SATURATION: 94 % | HEART RATE: 93 BPM | WEIGHT: 178.2 LBS | HEIGHT: 67 IN | SYSTOLIC BLOOD PRESSURE: 143 MMHG

## 2024-01-30 VITALS
SYSTOLIC BLOOD PRESSURE: 133 MMHG | RESPIRATION RATE: 18 BRPM | HEART RATE: 84 BPM | WEIGHT: 178 LBS | OXYGEN SATURATION: 94 % | TEMPERATURE: 98 F | DIASTOLIC BLOOD PRESSURE: 63 MMHG | HEIGHT: 67 IN | BODY MASS INDEX: 27.94 KG/M2

## 2024-01-30 DIAGNOSIS — R07.81 PLEURITIC CHEST PAIN: Primary | ICD-10-CM

## 2024-01-30 DIAGNOSIS — R07.81 PLEURITIC CHEST PAIN: ICD-10-CM

## 2024-01-30 DIAGNOSIS — M94.0 COSTOCHONDRITIS: Primary | ICD-10-CM

## 2024-01-30 LAB
2HR DELTA HS TROPONIN: -3 NG/L
ALBUMIN SERPL BCP-MCNC: 4 G/DL (ref 3.5–5)
ALP SERPL-CCNC: 89 U/L (ref 34–104)
ALT SERPL W P-5'-P-CCNC: 13 U/L (ref 7–52)
ANION GAP SERPL CALCULATED.3IONS-SCNC: 3 MMOL/L
APTT PPP: 55 SECONDS (ref 23–37)
AST SERPL W P-5'-P-CCNC: 19 U/L (ref 13–39)
ATRIAL RATE: 91 BPM
BASOPHILS # BLD AUTO: 0.06 THOUSANDS/ÂΜL (ref 0–0.1)
BASOPHILS NFR BLD AUTO: 1 % (ref 0–1)
BILIRUB SERPL-MCNC: 0.85 MG/DL (ref 0.2–1)
BUN SERPL-MCNC: 14 MG/DL (ref 5–25)
CALCIUM SERPL-MCNC: 9.2 MG/DL (ref 8.4–10.2)
CARDIAC TROPONIN I PNL SERPL HS: 28 NG/L
CARDIAC TROPONIN I PNL SERPL HS: 31 NG/L
CHLORIDE SERPL-SCNC: 103 MMOL/L (ref 96–108)
CO2 SERPL-SCNC: 29 MMOL/L (ref 21–32)
CREAT SERPL-MCNC: 0.68 MG/DL (ref 0.6–1.3)
EOSINOPHIL # BLD AUTO: 0.26 THOUSAND/ÂΜL (ref 0–0.61)
EOSINOPHIL NFR BLD AUTO: 3 % (ref 0–6)
ERYTHROCYTE [DISTWIDTH] IN BLOOD BY AUTOMATED COUNT: 13.3 % (ref 11.6–15.1)
GFR SERPL CREATININE-BSD FRML MDRD: 86 ML/MIN/1.73SQ M
GLUCOSE SERPL-MCNC: 97 MG/DL (ref 65–140)
HCT VFR BLD AUTO: 43.1 % (ref 34.8–46.1)
HGB BLD-MCNC: 14 G/DL (ref 11.5–15.4)
IMM GRANULOCYTES # BLD AUTO: 0.04 THOUSAND/UL (ref 0–0.2)
IMM GRANULOCYTES NFR BLD AUTO: 0 % (ref 0–2)
INR PPP: 0.96 (ref 0.84–1.19)
LYMPHOCYTES # BLD AUTO: 2.72 THOUSANDS/ÂΜL (ref 0.6–4.47)
LYMPHOCYTES NFR BLD AUTO: 26 % (ref 14–44)
MCH RBC QN AUTO: 28.6 PG (ref 26.8–34.3)
MCHC RBC AUTO-ENTMCNC: 32.5 G/DL (ref 31.4–37.4)
MCV RBC AUTO: 88 FL (ref 82–98)
MONOCYTES # BLD AUTO: 1.27 THOUSAND/ÂΜL (ref 0.17–1.22)
MONOCYTES NFR BLD AUTO: 12 % (ref 4–12)
NEUTROPHILS # BLD AUTO: 6.15 THOUSANDS/ÂΜL (ref 1.85–7.62)
NEUTS SEG NFR BLD AUTO: 58 % (ref 43–75)
NRBC BLD AUTO-RTO: 0 /100 WBCS
P AXIS: 42 DEGREES
PLATELET # BLD AUTO: 352 THOUSANDS/UL (ref 149–390)
PMV BLD AUTO: 10 FL (ref 8.9–12.7)
POTASSIUM SERPL-SCNC: 4.3 MMOL/L (ref 3.5–5.3)
PR INTERVAL: 156 MS
PROT SERPL-MCNC: 7.8 G/DL (ref 6.4–8.4)
PROTHROMBIN TIME: 13 SECONDS (ref 11.6–14.5)
QRS AXIS: 12 DEGREES
QRSD INTERVAL: 74 MS
QT INTERVAL: 372 MS
QTC INTERVAL: 457 MS
RBC # BLD AUTO: 4.9 MILLION/UL (ref 3.81–5.12)
SODIUM SERPL-SCNC: 135 MMOL/L (ref 135–147)
T WAVE AXIS: -10 DEGREES
VENTRICULAR RATE: 91 BPM
WBC # BLD AUTO: 10.5 THOUSAND/UL (ref 4.31–10.16)

## 2024-01-30 PROCEDURE — 99283 EMERGENCY DEPT VISIT LOW MDM: CPT

## 2024-01-30 PROCEDURE — G0463 HOSPITAL OUTPT CLINIC VISIT: HCPCS | Performed by: PHYSICIAN ASSISTANT

## 2024-01-30 PROCEDURE — 84484 ASSAY OF TROPONIN QUANT: CPT | Performed by: EMERGENCY MEDICINE

## 2024-01-30 PROCEDURE — 85610 PROTHROMBIN TIME: CPT | Performed by: EMERGENCY MEDICINE

## 2024-01-30 PROCEDURE — 93005 ELECTROCARDIOGRAM TRACING: CPT

## 2024-01-30 PROCEDURE — 80053 COMPREHEN METABOLIC PANEL: CPT | Performed by: EMERGENCY MEDICINE

## 2024-01-30 PROCEDURE — 99285 EMERGENCY DEPT VISIT HI MDM: CPT | Performed by: EMERGENCY MEDICINE

## 2024-01-30 PROCEDURE — 36415 COLL VENOUS BLD VENIPUNCTURE: CPT

## 2024-01-30 PROCEDURE — 85730 THROMBOPLASTIN TIME PARTIAL: CPT | Performed by: EMERGENCY MEDICINE

## 2024-01-30 PROCEDURE — 85025 COMPLETE CBC W/AUTO DIFF WBC: CPT | Performed by: EMERGENCY MEDICINE

## 2024-01-30 PROCEDURE — 93005 ELECTROCARDIOGRAM TRACING: CPT | Performed by: PHYSICIAN ASSISTANT

## 2024-01-30 PROCEDURE — 71046 X-RAY EXAM CHEST 2 VIEWS: CPT

## 2024-01-30 PROCEDURE — 99213 OFFICE O/P EST LOW 20 MIN: CPT | Performed by: PHYSICIAN ASSISTANT

## 2024-01-30 RX ORDER — FENTANYL CITRATE 50 UG/ML
25 INJECTION, SOLUTION INTRAMUSCULAR; INTRAVENOUS ONCE
Status: DISCONTINUED | OUTPATIENT
Start: 2024-01-30 | End: 2024-01-30

## 2024-01-30 RX ORDER — HYDROCODONE BITARTRATE AND ACETAMINOPHEN 5; 325 MG/1; MG/1
2 TABLET ORAL
Qty: 20 TABLET | Refills: 0 | Status: SHIPPED | OUTPATIENT
Start: 2024-01-30 | End: 2024-02-09

## 2024-01-30 RX ORDER — OXYCODONE HYDROCHLORIDE AND ACETAMINOPHEN 5; 325 MG/1; MG/1
1 TABLET ORAL ONCE
Status: COMPLETED | OUTPATIENT
Start: 2024-01-30 | End: 2024-01-30

## 2024-01-30 RX ADMIN — OXYCODONE HYDROCHLORIDE AND ACETAMINOPHEN 1 TABLET: 5; 325 TABLET ORAL at 13:49

## 2024-01-30 NOTE — DISCHARGE INSTRUCTIONS
Return to the ER immediately for any worsening symptoms.  Please follow-up with your PCP for further evaluation and management.

## 2024-01-30 NOTE — PROGRESS NOTES
"  [facility]        NAME: Beverley Abebe is a 73 y.o. female  : 1950    MRN: [id number]  DATE: 2024  TIME: 11:00 AM    Assessment and Plan   Pleuritic chest pain [R07.81]  1. Pleuritic chest pain  XR chest pa & lateral    Transfer to other facility    ECG 12 lead            Patient Instructions   Go to ED    Follow up with PCP in 3-5 days.  Proceed to  ER if symptoms worsen.    Chief Complaint     Chief Complaint   Patient presents with    Rib Pain     Left sided rib pain starting yesterday. Pain with inspiration. Slight SOB. Denies any injuries to the area. Hx of pna         History of Present Illness       Patient is 73-year-old female with no sniffing and past medical history presents the office complaining of left-sided rib pain since yesterday.  Pain is rated 5-10 \" very uncomfortable\" located to the left lateral and posterior ribs which is greatly worse with deep inspiration.  States she has noticed that her heart rate has been elevated.  Reports dyspnea on exertion but not at rest.  Denies any injury or trauma.  Denies fevers, chills, cough, chest pain, jaw pain, left arm pain.  History of pneumonia similar symptoms and states she did not have a cough or fever at that time.        Review of Systems   Review of Systems   Constitutional:  Negative for fever.   HENT:  Negative for congestion and sore throat.    Respiratory:  Positive for shortness of breath. Negative for cough, chest tightness and wheezing.    Cardiovascular:  Positive for chest pain and palpitations.   Gastrointestinal:  Negative for abdominal pain, diarrhea, nausea and vomiting.   Musculoskeletal:  Negative for myalgias.   Skin:  Negative for rash.   Neurological:  Negative for dizziness, light-headedness and headaches.         Current Medications       Current Outpatient Medications:     Cholecalciferol (VITAMIN D3 PO), Take by mouth, Disp: , Rfl:     docusate sodium ([employer]S Stool Softener) 100 mg capsule, Take by " "mouth, Disp: , Rfl:     HYDROcodone-acetaminophen (NORCO) 5-325 mg per tablet, Take 2 tablets by mouth daily at bedtime, Disp: , Rfl:     Multiple Vitamins-Minerals (ZINC PO), Take by mouth, Disp: , Rfl:     Current Allergies     Allergies as of 01/30/2024 - Reviewed 01/30/2024   Allergen Reaction Noted    [place] (diagnostic) - food allergy Rash 05/26/2023    Ibuprofen Rash 05/26/2023    Penicillins Rash 05/26/2023            The following portions of the patient's history were reviewed and updated as appropriate: allergies, current medications, past family history, past medical history, past social history, past surgical history and problem list.     Past Medical History:   Diagnosis Date    Back pain     disc injury       Past Surgical History:   Procedure Laterality Date    APPENDECTOMY      TONSILLECTOMY      TUBAL LIGATION         Family History   Problem Relation Age of Onset    Diabetes Mother     Diabetes Sister     Diabetes Brother          Medications have been verified.        Objective   /72   Pulse 93   Temp 98.6 °F (37 °C)   Resp 16   Ht 5' 7\" (1.702 m)   Wt 80.8 kg (178 lb 3.2 oz)   SpO2 94%   BMI 27.91 kg/m²   No LMP recorded. Patient is postmenopausal.       Physical Exam     Physical Exam  Vitals and nursing note reviewed.   Constitutional:       Appearance: Normal appearance. She is well-developed.   HENT:      Head: Normocephalic and atraumatic.      Right Ear: Tympanic membrane, ear canal and external ear normal.      Left Ear: Tympanic membrane, ear canal and external ear normal.      Nose: Nose normal.      Mouth/Throat:      Pharynx: Uvula midline.   Eyes:      General: Lids are normal.      Conjunctiva/sclera: Conjunctivae normal.      Pupils: Pupils are equal, round, and reactive to light.   Cardiovascular:      Rate and Rhythm: Normal rate and regular rhythm.      Pulses: Normal pulses.      Heart sounds: Normal heart sounds. No murmur heard.     No friction rub. No gallop. "   Pulmonary:      Effort: Pulmonary effort is normal.      Breath sounds: Normal breath sounds. No wheezing, rhonchi or rales.      Comments: Visible discomfort with deep inspiration  Chest:      Chest wall: No swelling or tenderness.   Abdominal:      General: Bowel sounds are normal.      Palpations: Abdomen is soft.      Tenderness: There is no abdominal tenderness.   Musculoskeletal:         General: Normal range of motion.      Cervical back: Neck supple.   Lymphadenopathy:      Cervical: No cervical adenopathy.   Skin:     General: Skin is warm and dry.      Capillary Refill: Capillary refill takes less than 2 seconds.   Neurological:      Mental Status: She is alert.         Chest xr: Per radiology, No acute cardiopulmonary disease.  Benign bibasilar linear atelectasis or scar with moderate elevation of the right diaphragm.      EKG: Normal sinus rhythm.  No ST elevation or T wave depression.

## 2024-01-30 NOTE — ED PROVIDER NOTES
History  Chief Complaint   Patient presents with    Rib Pain     R side rib cage pain x two days; denies injury, pain w/ inspiration     This is a 73-year-old female presenting to the ED for evaluation of left lateral rib tenderness with inspiration for the past 2 days.  Patient denies any fall or injury and states that she has some pain with movement of the left arm.  She has not had any fever or chills or any cough.  Patient states her pain is about a 4 out of 10 and she is not taking anything for pain.  Patient had an outpatient x-ray done today that did not show any acute findings.        Prior to Admission Medications   Prescriptions Last Dose Informant Patient Reported? Taking?   Cholecalciferol (VITAMIN D3 PO)   Yes No   Sig: Take by mouth   HYDROcodone-acetaminophen (NORCO) 5-325 mg per tablet   Yes No   Sig: Take 2 tablets by mouth daily at bedtime   HYDROcodone-acetaminophen (NORCO) 5-325 mg per tablet   No Yes   Sig: Take 2 tablets by mouth daily at bedtime for 10 days Max Daily Amount: 2 tablets   Multiple Vitamins-Minerals (ZINC PO)   Yes No   Sig: Take by mouth   docusate sodium ([employer] Stool Softener) 100 mg capsule   Yes No   Sig: Take by mouth      Facility-Administered Medications: None       Past Medical History:   Diagnosis Date    Back pain     disc injury       Past Surgical History:   Procedure Laterality Date    APPENDECTOMY      TONSILLECTOMY      TUBAL LIGATION         Family History   Problem Relation Age of Onset    Diabetes Mother     Diabetes Sister     Diabetes Brother      I have reviewed and agree with the history as documented.    E-Cigarette/Vaping    E-Cigarette Use Never User      E-Cigarette/Vaping Substances     Social History     Tobacco Use    Smoking status: Never    Smokeless tobacco: Never   Vaping Use    Vaping status: Never Used   Substance Use Topics    Alcohol use: Never    Drug use: Never       Review of Systems   Constitutional:  Negative for chills and fever.   HENT:   Negative for ear pain and sore throat.    Eyes:  Negative for pain and visual disturbance.   Respiratory:  Negative for cough, shortness of breath, wheezing and stridor.         Rib pain     Cardiovascular:  Negative for chest pain, palpitations and leg swelling.   Gastrointestinal:  Negative for abdominal pain and vomiting.   Genitourinary:  Negative for dysuria and hematuria.   Musculoskeletal:  Negative for arthralgias and back pain.   Skin:  Negative for color change and rash.   Neurological:  Negative for seizures and syncope.   All other systems reviewed and are negative.      Physical Exam  Physical Exam  Vitals and nursing note reviewed.   Constitutional:       General: She is not in acute distress.     Appearance: Normal appearance. She is well-developed and normal weight. She is not ill-appearing.   HENT:      Head: Normocephalic and atraumatic.      Right Ear: External ear normal.      Left Ear: External ear normal.      Nose: Nose normal. No congestion or rhinorrhea.      Mouth/Throat:      Mouth: Mucous membranes are moist.   Eyes:      Conjunctiva/sclera: Conjunctivae normal.   Neck:      Vascular: No carotid bruit.   Cardiovascular:      Rate and Rhythm: Normal rate and regular rhythm.      Pulses: Normal pulses.      Heart sounds: Normal heart sounds. No murmur heard.  Pulmonary:      Effort: Pulmonary effort is normal. No respiratory distress.      Breath sounds: Normal breath sounds. No stridor. No wheezing, rhonchi or rales.      Comments: Reproducible left lateral rib tenderness to palpation at the level of T4, no crepitus or paradoxical breathing  Chest:      Chest wall: Tenderness present.   Abdominal:      General: Abdomen is flat. Bowel sounds are normal. There is no distension.      Palpations: Abdomen is soft. There is no mass.      Tenderness: There is no abdominal tenderness. There is no guarding or rebound.      Hernia: No hernia is present.   Musculoskeletal:         General: No  swelling. Normal range of motion.      Cervical back: Normal range of motion and neck supple. No rigidity or tenderness.   Lymphadenopathy:      Cervical: No cervical adenopathy.   Skin:     General: Skin is warm and dry.      Capillary Refill: Capillary refill takes less than 2 seconds.   Neurological:      General: No focal deficit present.      Mental Status: She is alert and oriented to person, place, and time. Mental status is at baseline.   Psychiatric:         Mood and Affect: Mood normal.         Vital Signs  ED Triage Vitals [01/30/24 1132]   Temperature Pulse Respirations Blood Pressure SpO2   98 °F (36.7 °C) 90 18 121/86 94 %      Temp src Heart Rate Source Patient Position - Orthostatic VS BP Location FiO2 (%)   -- Monitor Sitting Left arm --      Pain Score       2           Vitals:    01/30/24 1245 01/30/24 1300 01/30/24 1315 01/30/24 1330   BP: 130/60 124/67 123/66 119/63   Pulse: 78 84 87 82   Patient Position - Orthostatic VS:             Visual Acuity      ED Medications  Medications   oxyCODONE-acetaminophen (PERCOCET) 5-325 mg per tablet 1 tablet (has no administration in time range)       Diagnostic Studies  Results Reviewed       Procedure Component Value Units Date/Time    HS Troponin I 2hr [[id number]] Collected: 01/30/24 1341    Lab Status: In process Specimen: Blood from Arm, Right Updated: 01/30/24 1344    HS Troponin I 4hr [[id number]]     Lab Status: No result Specimen: Blood     HS Troponin 0hr (reflex protocol) [[id number]]  (Normal) Collected: 01/30/24 1149    Lab Status: Final result Specimen: Blood from Arm, Right Updated: 01/30/24 1216     hs TnI 0hr 31 ng/L     APTT [[id number]]  (Abnormal) Collected: 01/30/24 1149    Lab Status: Final result Specimen: Blood from Arm, Right Updated: 01/30/24 1211     PTT 55 seconds     Protime-INR [[id number]]  (Normal) Collected: 01/30/24 1149    Lab Status: Final result Specimen: Blood from Arm, Right Updated: 01/30/24 1211     Protime 13.0 seconds       INR 0.96    Comprehensive metabolic panel [[id number]] Collected: 01/30/24 1149    Lab Status: Final result Specimen: Blood from Arm, Right Updated: 01/30/24 1209     Sodium 135 mmol/L      Potassium 4.3 mmol/L      Chloride 103 mmol/L      CO2 29 mmol/L      ANION GAP 3 mmol/L      BUN 14 mg/dL      Creatinine 0.68 mg/dL      Glucose 97 mg/dL      Calcium 9.2 mg/dL      AST 19 U/L      ALT 13 U/L      Alkaline Phosphatase 89 U/L      Total Protein 7.8 g/dL      Albumin 4.0 g/dL      Total Bilirubin 0.85 mg/dL      eGFR 86 ml/min/1.73sq m     Narrative:      National Kid[employer] Disease Foundation guidelines for Chronic Kidney Disease (CKD):     Stage 1 with normal or high GFR (GFR > 90 mL/min/1.73 square meters)    Stage 2 Mild CKD (GFR = 60-89 mL/min/1.73 square meters)    Stage 3A Moderate CKD (GFR = 45-59 mL/min/1.73 square meters)    Stage 3B Moderate CKD (GFR = 30-44 mL/min/1.73 square meters)    Stage 4 Severe CKD (GFR = 15-29 mL/min/1.73 square meters)    Stage 5 End Stage CKD (GFR <15 mL/min/1.73 square meters)  Note: GFR calculation is accurate only with a steady state creatinine    CBC and differential [[id number]]  (Abnormal) Collected: 01/30/24 1149    Lab Status: Final result Specimen: Blood from Arm, Right Updated: 01/30/24 1153     WBC 10.50 Thousand/uL      RBC 4.90 Million/uL      Hemoglobin 14.0 g/dL      Hematocrit 43.1 %      MCV 88 fL      MCH 28.6 pg      MCHC 32.5 g/dL      RDW 13.3 %      MPV 10.0 fL      Platelets 352 Thousands/uL      nRBC 0 /100 WBCs      Neutrophils Relative 58 %      Immat GRANS % 0 %      Lymphocytes Relative 26 %      Monocytes Relative 12 %      Eosinophils Relative 3 %      Basophils Relative 1 %      Neutrophils Absolute 6.15 Thousands/µL      Immature Grans Absolute 0.04 Thousand/uL      Lymphocytes Absolute 2.72 Thousands/µL      Monocytes Absolute 1.27 Thousand/µL      Eosinophils Absolute 0.26 Thousand/µL      Basophils Absolute 0.06 Thousands/µL                     No orders to display              Procedures  ECG 12 Lead Documentation Only    Date/Time: 1/30/2024 1:31 PM    Performed by: Zayra Muñoz DO  Authorized by: Zayra Muñoz DO    Indications / Diagnosis:  Rib pain  ECG reviewed by me, the ED Provider: yes    Patient location:  ED  Previous ECG:     Previous ECG:  Unavailable    Comparison to cardiac monitor: Yes    Interpretation:     Interpretation: normal    Rate:     ECG rate:  92    ECG rate assessment: normal    Rhythm:     Rhythm: sinus rhythm    Ectopy:     Ectopy: none    QRS:     QRS axis:  Normal    QRS intervals:  Normal  Conduction:     Conduction: normal    ST segments:     ST segments:  Normal  T waves:     T waves: inverted    Q waves:     Q waves:  III and aVF           ED Course  ED Course as of 01/30/24 1346   Tue Jan 30, 2024   1341 I reviewed the radiologist interpretation of the chest x-ray that was done outpatient.  As per the radiology interpretation there does not appear to be any fracture or consolidation or infiltrate that can explain the patient's symptoms.  Patient's lungs are clear on my physical exam and her tenderness is reproducible with deep inspiration and with my palpation.  Symptoms are more consistent with costochondritis.  Furthermore she has no leukocytosis and she is afebrile.  I will treat the patient's pain and discharge her home to follow-up outpatient with her PCP.                               SBIRT 20yo+      Flowsheet Row Most Recent Value   Initial Alcohol Screen: US AUDIT-C     1. How often do you have a drink containing alcohol? 0 Filed at: 01/30/2024 1132   2. How many drinks containing alcohol do you have on a typical day you are drinking?  0 Filed at: 01/30/2024 1132   3a. Male UNDER 65: How often do you have five or more drinks on one occasion? 0 Filed at: 01/30/2024 1132   3b. FEMALE Any Age, or MALE 65+: How often do you have 4 or more drinks on one occassion? 0 Filed at: 01/30/2024  1132   Audit-C Score 0 Filed at: 01/30/2024 1132   THANIA: How many times in the past year have you...    Used an illegal drug or used a prescription medication for non-medical reasons? Never Filed at: 01/30/2024 1132                      Medical Decision Making  74-year-old female with atraumatic rib pain.  Differential diagnosis includes but not limited to rib fracture, rib contusion, costochondritis, pneumonia    Amount and/or Complexity of Data Reviewed  Labs: ordered.             Disposition  Final diagnoses:   Costochondritis     Time reflects when diagnosis was documented in both MDM as applicable and the Disposition within this note       Time User Action Codes Description Comment    1/30/2024  1:43 PM Zayra Muñoz Add [M94.0] Costochondritis           ED Disposition       ED Disposition   Discharge    Condition   Stable    Date/Time   Tue Jan 30, 2024 1343    Comment   Beverley Abebe discharge to home/self care.                   Follow-up Information       Follow up With Specialties Details Why Contact Info    Ellen Lerma PA-C Family Medicine In 2 days  [address] [facility] [place] [address]  [phone]              Current Discharge Medication List        CONTINUE these medications which have CHANGED    Details   HYDROcodone-acetaminophen (NORCO) 5-325 mg per tablet Take 2 tablets by mouth daily at bedtime for 10 days Max Daily Amount: 2 tablets  Qty: 20 tablet, Refills: 0    Associated Diagnoses: Costochondritis           CONTINUE these medications which have NOT CHANGED    Details   Cholecalciferol (VITAMIN D3 PO) Take by mouth      docusate sodium ([employer] Stool Softener) 100 mg capsule Take by mouth      Multiple Vitamins-Minerals (ZINC PO) Take by mouth             No discharge procedures on file.    PDMP Review       None            ED Provider  Electronically Signed by             Zayra Muñoz DO  01/30/24 2239

## 2024-01-31 LAB
ATRIAL RATE: 92 BPM
P AXIS: 44 DEGREES
PR INTERVAL: 162 MS
QRS AXIS: 12 DEGREES
QRSD INTERVAL: 74 MS
QT INTERVAL: 374 MS
QTC INTERVAL: 462 MS
T WAVE AXIS: -8 DEGREES
VENTRICULAR RATE: 92 BPM

## 2024-01-31 PROCEDURE — 93010 ELECTROCARDIOGRAM REPORT: CPT | Performed by: INTERNAL MEDICINE

## 2024-05-08 ENCOUNTER — OFFICE VISIT (OUTPATIENT)
Dept: URGENT CARE | Facility: CLINIC | Age: 74
End: 2024-05-08
Payer: MEDICARE

## 2024-05-08 ENCOUNTER — TELEPHONE (OUTPATIENT)
Dept: URGENT CARE | Facility: CLINIC | Age: 74
End: 2024-05-08

## 2024-05-08 VITALS
DIASTOLIC BLOOD PRESSURE: 78 MMHG | TEMPERATURE: 97.8 F | WEIGHT: 175.8 LBS | HEIGHT: 67 IN | RESPIRATION RATE: 16 BRPM | HEART RATE: 91 BPM | OXYGEN SATURATION: 96 % | BODY MASS INDEX: 27.59 KG/M2 | SYSTOLIC BLOOD PRESSURE: 138 MMHG

## 2024-05-08 DIAGNOSIS — M54.50 CHRONIC LEFT-SIDED LOW BACK PAIN WITHOUT SCIATICA: Primary | ICD-10-CM

## 2024-05-08 DIAGNOSIS — G89.29 CHRONIC LEFT-SIDED LOW BACK PAIN WITHOUT SCIATICA: Primary | ICD-10-CM

## 2024-05-08 PROCEDURE — 99213 OFFICE O/P EST LOW 20 MIN: CPT | Performed by: PHYSICIAN ASSISTANT

## 2024-05-08 PROCEDURE — G0463 HOSPITAL OUTPT CLINIC VISIT: HCPCS | Performed by: PHYSICIAN ASSISTANT

## 2024-05-08 RX ORDER — HYDROCODONE BITARTRATE AND ACETAMINOPHEN 5; 325 MG/1; MG/1
2 TABLET ORAL
COMMUNITY
Start: 2024-05-01

## 2024-05-08 RX ORDER — HYDROCODONE BITARTRATE AND ACETAMINOPHEN 10; 325 MG/1; MG/1
1 TABLET ORAL EVERY 6 HOURS PRN
Qty: 15 TABLET | Refills: 0 | Status: SHIPPED | OUTPATIENT
Start: 2024-05-08

## 2024-05-08 NOTE — LETTER
May 8, 2024       Patient: Beverley Abebe   YOB: 1950   Date of Visit: 5/8/2024        Dear Ellen Lerma PA-C:    Beverley Abebe was seen in our urgent care department on 5/8/2024. She was instructed to increase her frequency of Norco 5-325 from HS to every 4-6 hours for severe pain to treat acute severe back/leg pain for up to 5 days. This was advised due to severe allergy to NSAIDS and muscle relaxer. Due to this, she will need a refill of regular Norco sooner than usually.             Sincerely,        Marcell Chapman PA-C

## 2024-05-08 NOTE — TELEPHONE ENCOUNTER
Called pt to discuss phone call from pharm who states they can no fill increased dosage of  norco d/t recent fill of 5-325 norco.    Advised pt to take her normal script every 4-6 hours for up to 5 days for acute worsening of pain. Will write letter for PCP of instructions as to why pt ran out early. Pt to  letter in office.

## 2024-05-08 NOTE — PROGRESS NOTES
[facility]        NAME: Beverley Abebe is a 74 y.o. female  : 1950    MRN: [id number]  DATE: May 8, 2024  TIME: 10:58 AM    Assessment and Plan   Chronic left-sided low back pain without sciatica [M54.50, G89.29]  1. Chronic left-sided low back pain without sciatica  Ambulatory Referral to Physical Therapy    HYDROcodone-acetaminophen (NORCO)  mg per tablet        Patient is currently on [place] 5-325 for chronic back pain for many years.  Denies adverse side effects.  Patient unable to take NSAIDs due to rash.  States she has taken muscle relaxers in the past and it caused her throat to swell.  Unsure of which muscle relaxer.  Unsure if she can take prednisone.  She has tried increasing Tylenol but pain is still severe.  Will temporarily increase Norco dosage.  Advised on sedating effects.  Advised to follow-up with her PCP for additional pain management if needed.    Patient Instructions   Medication as prescribed.  Caution for sedating effects.  Consider Tylenol.  Heat.  Physical therapy.    Follow up with PCP in 3-5 days.  Proceed to  ER if symptoms worsen.    If tests have been performed at [facility], our office will contact you with results if changes need to be made to the care plan discussed with you at the visit.  You can review your full results on [facility] MyChart.    Chief Complaint     Chief Complaint   Patient presents with    Leg Pain     Left leg pain; started 6 weeks ago;pain in calf and now radiates up to thigh;  Denies any redness/heat  Denies any injury            History of Present Illness       Patient is a 74-year-old female with significant past medical history of chronic back pain presents the office complaining of left leg pain for 6 weeks.  Reports symptoms come and go but have been getting worse.  Pain is located to the left hip, left thigh, and proximal calf.  Pain is currently a 3 out of 10 burning but can increase so severe that it causes her to cry.  Symptoms  were worse when she tried to go hiking and for long walk but improved when she rested.  Does work in the yard doing weeding and gardening.  Denies trauma or falls.  Denies numbness and tingling, saddle anesthesia, urinary symptoms, or change in bowel habits.  She takes Norco 5-325 at night for many years for chronic back pain.  She reports allergy to ibuprofen which causes rash.  States she is taken muscle relaxers in the past but states it caused her to throat swell.  Unsure if she can take prednisone.  She has tried increasing Tylenol without significant relief.  Reports her children made her be seen today because symptoms are worsening and not resolving.        Review of Systems   Review of Systems      Current Medications       Current Outpatient Medications:     Cholecalciferol (VITAMIN D3 PO), Take by mouth, Disp: , Rfl:     HYDROcodone-acetaminophen (NORCO)  mg per tablet, Take 1 tablet by mouth every 6 (six) hours as needed for moderate pain Max Daily Amount: 4 tablets, Disp: 15 tablet, Rfl: 0    HYDROcodone-acetaminophen (NORCO) 5-325 mg per tablet, Take 2 tablets by mouth, Disp: , Rfl:     Multiple Vitamins-Minerals (ZINC PO), Take by mouth, Disp: , Rfl:     docusate sodium ([employer]S Stool Softener) 100 mg capsule, Take by mouth (Patient not taking: Reported on 5/8/2024), Disp: , Rfl:     Current Allergies     Allergies as of 05/08/2024 - Reviewed 01/30/2024   Allergen Reaction Noted    [place] (diagnostic) - food allergy Rash 05/26/2023    Ibuprofen Rash 05/26/2023    Penicillins Rash 05/26/2023            The following portions of the patient's history were reviewed and updated as appropriate: allergies, current medications, past family history, past medical history, past social history, past surgical history and problem list.     Past Medical History:   Diagnosis Date    Back pain     disc injury       Past Surgical History:   Procedure Laterality Date    APPENDECTOMY      TONSILLECTOMY      TUBAL  "LIGATION         Family History   Problem Relation Age of Onset    Diabetes Mother     Diabetes Sister     Diabetes Brother          Medications have been verified.        Objective   /78   Pulse 91   Temp 97.8 °F (36.6 °C)   Resp 16   Ht 5' 7\" (1.702 m)   Wt 79.7 kg (175 lb 12.8 oz)   SpO2 96%   BMI 27.53 kg/m²   No LMP recorded. Patient is postmenopausal.       Physical Exam     Physical Exam  Vitals and nursing note reviewed.   Constitutional:       Appearance: She is well-developed.   HENT:      Head: Normocephalic and atraumatic.      Right Ear: External ear normal.      Left Ear: External ear normal.      Nose: Nose normal.   Eyes:      General: Lids are normal.      Conjunctiva/sclera: Conjunctivae normal.   Musculoskeletal:      Lumbar back: Tenderness and bony tenderness present. No spasms. Decreased range of motion (d/t pain). Negative right straight leg raise test and negative left straight leg raise test.        Back:       Comments: TTP lateral thigh from hip to knee. NTTP calf. No calf swelling, discoloration or warmth.   Skin:     General: Skin is warm and dry.      Capillary Refill: Capillary refill takes less than 2 seconds.      Findings: No rash.   Neurological:      Mental Status: She is alert.                   "

## 2025-06-25 ENCOUNTER — OFFICE VISIT (OUTPATIENT)
Age: 75
End: 2025-06-25
Payer: MEDICARE

## 2025-06-25 VITALS
SYSTOLIC BLOOD PRESSURE: 122 MMHG | BODY MASS INDEX: 27.47 KG/M2 | HEIGHT: 67 IN | TEMPERATURE: 98.5 F | DIASTOLIC BLOOD PRESSURE: 78 MMHG | WEIGHT: 175 LBS | RESPIRATION RATE: 18 BRPM | OXYGEN SATURATION: 98 % | HEART RATE: 86 BPM

## 2025-06-25 DIAGNOSIS — M65.311 TRIGGER FINGER OF RIGHT THUMB: Primary | ICD-10-CM

## 2025-06-25 PROCEDURE — G0463 HOSPITAL OUTPT CLINIC VISIT: HCPCS

## 2025-06-25 PROCEDURE — 99212 OFFICE O/P EST SF 10 MIN: CPT

## 2025-06-25 RX ORDER — PREDNISONE 20 MG/1
40 TABLET ORAL DAILY
Qty: 10 TABLET | Refills: 0 | Status: SHIPPED | OUTPATIENT
Start: 2025-06-25 | End: 2025-06-30

## 2025-06-25 NOTE — PROGRESS NOTES
[facility]        NAME: Beverley Abebe is a 75 y.o. female  : 1950    MRN: [id number]  DATE: 2025  TIME: 4:34 PM    Assessment and Plan   Trigger finger of right thumb [M65.311]  1. Trigger finger of right thumb  predniSONE 20 mg tablet    Ambulatory Referral to Orthopedic Surgery        Right thumb pain and clicking. Stiff at times. No injury. Appears to correlate with trigger finger. Will order steroids and refer to ortho    Patient Instructions       Follow up with PCP in 3-5 days.  Proceed to  ER if symptoms worsen.    If tests have been performed at [facility], our office will contact you with results if changes need to be made to the care plan discussed with you at the visit.  You can review your full results on [facility] MyChart.    Chief Complaint     Chief Complaint   Patient presents with    Thumb Pain     RIGHT thumb pain x 1 week. 6/10 pain when using thumb at joint. Nothing OTC.          History of Present Illness       Right thumb pain and clicking. Stiff at times. No injury. Appears to correlate with trigger finger. Will order steroids and refer to ortho        Review of Systems   Review of Systems   Musculoskeletal:  Positive for arthralgias. Negative for joint swelling.   Skin:  Negative for color change.   All other systems reviewed and are negative.        Current Medications     Current Medications[1]    Current Allergies     Allergies as of 2025 - Reviewed 2025   Allergen Reaction Noted    [place] (diagnostic) - food allergy Rash 2023    Ibuprofen Rash 2023    Penicillins Rash 2023            The following portions of the patient's history were reviewed and updated as appropriate: allergies, current medications, past family history, past medical history, past social history, past surgical history and problem list.     Past Medical History[2]    Past Surgical History[3]    Family History[4]      Medications have been  "verified.        Objective   /78   Pulse 86   Temp 98.5 °F (36.9 °C)   Resp 18   Ht 5' 7\" (1.702 m)   Wt 79.4 kg (175 lb)   SpO2 98%   BMI 27.41 kg/m²   No LMP recorded. Patient is postmenopausal.       Physical Exam     Physical Exam  Vitals reviewed.     Musculoskeletal:         General: Tenderness present. No signs of injury.      Right hand: Tenderness present. Decreased range of motion.      Comments: Clicking at times                        [1]   Current Outpatient Medications:     Cholecalciferol (VITAMIN D3 PO), Take by mouth, Disp: , Rfl:     HYDROcodone-acetaminophen (NORCO) 5-325 mg per tablet, Take 2 tablets by mouth, Disp: , Rfl:     Multiple Vitamins-Minerals (ZINC PO), Take by mouth, Disp: , Rfl:     predniSONE 20 mg tablet, Take 2 tablets (40 mg total) by mouth daily for 5 days, Disp: 10 tablet, Rfl: 0    docusate sodium ([employer] Stool Softener) 100 mg capsule, Take by mouth (Patient not taking: Reported on 5/8/2024), Disp: , Rfl:     HYDROcodone-acetaminophen (NORCO)  mg per tablet, Take 1 tablet by mouth every 6 (six) hours as needed for moderate pain Max Daily Amount: 4 tablets (Patient not taking: Reported on 6/25/2025), Disp: 15 tablet, Rfl: 0  [2]   Past Medical History:  Diagnosis Date    Back pain     disc injury   [3]   Past Surgical History:  Procedure Laterality Date    APPENDECTOMY      TONSILLECTOMY      TUBAL LIGATION     [4]   Family History  Problem Relation Name Age of Onset    Diabetes Mother      Diabetes Sister      Diabetes Brother       "